# Patient Record
(demographics unavailable — no encounter records)

---

## 2025-06-03 NOTE — DEVELOPMENTAL MILESTONES
[Chooses preferred foods] : chooses preferred foods [Starts/continues conversation with peers] : starts/continues conversation with peers [Plays and interacts with at least one] : plays and interacts with at least one "best friend" [Tells a story with a beginning,] : tells a story with a beginning, a middle, and an end [Masters all consonant sounds and] : masters all consonant sounds and combinations, such as "d" or "ch" [Prints 3 or more simple words] : prints 3 or more simple words without copying [Writes first and last name in] : writes first and last name in uppercase or lowercase letters

## 2025-06-03 NOTE — PHYSICAL EXAM
[Alert] : alert [No Acute Distress] : no acute distress [Normocephalic] : normocephalic [Conjunctivae with no discharge] : conjunctivae with no discharge [PERRL] : PERRL [EOMI Bilateral] : EOMI bilateral [No Low Set Ear] : no low set ear [Auricles Well Formed] : auricles well formed [Clear Tympanic membranes with present light reflex and bony landmarks] : clear tympanic membranes with present light reflex and bony landmarks [Auditory Canals Clear] : auditory canals clear [No Discharge] : no discharge [Nares Patent] : nares patent [Pink Nasal Mucosa] : pink nasal mucosa [Palate Intact] : palate intact [Uvula Midline] : uvula midline [Nonerythematous Oropharynx] : nonerythematous oropharynx [Supple, full passive range of motion] : supple, full passive range of motion [No Palpable Masses] : no palpable masses [Symmetric Chest Rise] : symmetric chest rise [Clear to Auscultation Bilaterally] : clear to auscultation bilaterally [Regular Rate and Rhythm] : regular rate and rhythm [Normal S1, S2 present] : normal S1, S2 present [No Murmurs] : no murmurs [+2 Femoral Pulses] : +2 femoral pulses [Soft] : soft [NonTender] : non tender [Non Distended] : non distended [Normoactive Bowel Sounds] : normoactive bowel sounds [No Hepatomegaly] : no hepatomegaly [No Splenomegaly] : no splenomegaly [Patent] : patent [No fissures] : no fissures [No Abnormal Lymph Nodes Palpated] : no abnormal lymph nodes palpated [No Gait Asymmetry] : no gait asymmetry [No pain or deformities with palpation of bone, muscles, joints] : no pain or deformities with palpation of bone, muscles, joints [Normal Muscle Tone] : normal muscle tone [Straight] : straight [+2 Patella DTR] : +2 patella DTR [Cranial Nerves Grossly Intact] : cranial nerves grossly intact [No Rash or Lesions] : no rash or lesions

## 2025-06-04 NOTE — END OF VISIT
[] : A student assisted with documenting this visit. I have reviewed and verified all information documented by the student, and made modifications to such information, when appropriate. [FreeTextEntry3] : seen with MS3 Dawn Benedict

## 2025-06-04 NOTE — HISTORY OF PRESENT ILLNESS
[Mother] : mother [Fruit] : fruit [Vegetables] : vegetables [Meat] : meat [Grains] : grains [Dairy] : dairy [___ stools per day] : [unfilled]  stools per day [___ voids per day] : [unfilled] voids per day [Normal] : Normal [Brushing teeth] : Brushing teeth [No] : Patient does not go to dentist yearly [Appropiate parent-child-sibling interaction] : Appropriate parent-child-sibling interaction [Child Cooperates] : Child cooperates [Parent has appropriate responses to behavior] : Parent has appropriate responses to behavior [Grade ___] : Grade [unfilled] [No difficulties with Homework] : No difficulties with homework [Adequate performance] : Adequate performance [Up to date] : Up to date [de-identified] : N/A [FreeTextEntry3] : Sleeps with mother [de-identified] : Has not had a dental appointment yet

## 2025-06-04 NOTE — DISCUSSION/SUMMARY
[Normal Growth] : growth [Normal Development] : development [None] : No known medical problems [No Elimination Concerns] : elimination [No Feeding Concerns] : feeding [No Skin Concerns] : skin [Normal Sleep Pattern] : sleep [No Medications] : ~He/She~ is not on any medications [Patient] : patient [School Readiness] : school readiness [Mental Health] : mental health [Nutrition and Physical Activity] : nutrition and physical activity [Oral Health] : oral health [Safety] : safety [FreeTextEntry1] : AM is a 6-year-old girl with no PMHx presenting to the office for a 6-year-old WCC. She is up to date on immunizations. No growth, development, elimination, feeding, skin, and sleep concerns. She is in pre-K, and she is socializing appropriately with friends. She has not had a dental appointment yet, and the mother was advised to establish dental care. The following 5 y/o anticipatory guidance topics were discussed and/or handouts given: family support, nutrition, physical activity, oral health, and safety.  - Will obtain bloodwork: CBC, lead, cholesterol - RTC in Fall 2025 for influenza vaccine - RTC in 1 year for 7-year-old WCC

## 2025-06-04 NOTE — DISCUSSION/SUMMARY
[Normal Growth] : growth [Normal Development] : development [None] : No known medical problems [No Elimination Concerns] : elimination [No Feeding Concerns] : feeding [No Skin Concerns] : skin [Normal Sleep Pattern] : sleep [No Medications] : ~He/She~ is not on any medications [Patient] : patient [School Readiness] : school readiness [Mental Health] : mental health [Nutrition and Physical Activity] : nutrition and physical activity [Oral Health] : oral health [Safety] : safety [FreeTextEntry1] : AM is a 6-year-old girl with no PMHx presenting to the office for a 6-year-old WCC. She is up to date on immunizations. No growth, development, elimination, feeding, skin, and sleep concerns. She is in pre-K, and she is socializing appropriately with friends. She has not had a dental appointment yet, and the mother was advised to establish dental care. The following 7 y/o anticipatory guidance topics were discussed and/or handouts given: family support, nutrition, physical activity, oral health, and safety.  - Will obtain bloodwork: CBC, lead, cholesterol - RTC in Fall 2025 for influenza vaccine - RTC in 1 year for 7-year-old WCC

## 2025-06-04 NOTE — HISTORY OF PRESENT ILLNESS
[Mother] : mother [Fruit] : fruit [Vegetables] : vegetables [Meat] : meat [Grains] : grains [Dairy] : dairy [___ stools per day] : [unfilled]  stools per day [___ voids per day] : [unfilled] voids per day [Normal] : Normal [Brushing teeth] : Brushing teeth [No] : Patient does not go to dentist yearly [Appropiate parent-child-sibling interaction] : Appropriate parent-child-sibling interaction [Child Cooperates] : Child cooperates [Parent has appropriate responses to behavior] : Parent has appropriate responses to behavior [Grade ___] : Grade [unfilled] [No difficulties with Homework] : No difficulties with homework [Adequate performance] : Adequate performance [Up to date] : Up to date [de-identified] : N/A [FreeTextEntry3] : Sleeps with mother [de-identified] : Has not had a dental appointment yet